# Patient Record
Sex: FEMALE | Race: WHITE | Employment: FULL TIME | URBAN - METROPOLITAN AREA
[De-identification: names, ages, dates, MRNs, and addresses within clinical notes are randomized per-mention and may not be internally consistent; named-entity substitution may affect disease eponyms.]

---

## 2020-04-16 ENCOUNTER — HOSPITAL ENCOUNTER (EMERGENCY)
Age: 25
Discharge: HOME | End: 2020-04-16
Attending: EMERGENCY MEDICINE
Payer: COMMERCIAL

## 2020-04-16 VITALS
WEIGHT: 195 LBS | OXYGEN SATURATION: 100 % | HEART RATE: 76 BPM | HEIGHT: 69 IN | BODY MASS INDEX: 28.88 KG/M2 | SYSTOLIC BLOOD PRESSURE: 135 MMHG | TEMPERATURE: 98.6 F | RESPIRATION RATE: 16 BRPM | DIASTOLIC BLOOD PRESSURE: 65 MMHG

## 2020-04-16 PROCEDURE — 99283 EMERGENCY DEPT VISIT LOW MDM: CPT

## 2020-04-16 NOTE — ED PROVIDER NOTES
 Food insecurity     Worry: None     Inability: None    Transportation needs     Medical: None     Non-medical: None   Tobacco Use    Smoking status: Never Smoker    Smokeless tobacco: Never Used   Substance and Sexual Activity    Alcohol use: Not Currently    Drug use: Never    Sexual activity: None   Lifestyle    Physical activity     Days per week: None     Minutes per session: None    Stress: None   Relationships    Social connections     Talks on phone: None     Gets together: None     Attends Sabianist service: None     Active member of club or organization: None     Attends meetings of clubs or organizations: None     Relationship status: None    Intimate partner violence     Fear of current or ex partner: None     Emotionally abused: None     Physically abused: None     Forced sexual activity: None   Other Topics Concern    None   Social History Narrative    None       SCREENINGS    [place] Coma Scale  Eye Opening: Spontaneous  Best Verbal Response: Oriented  Best Motor Response: Obeys commands  [place] Coma Scale Score: 15 @FLOW([id number])@      PHYSICAL EXAM    (up to 7 for level 4, 8 or more for level 5)     ED Triage Vitals   BP Temp Temp Source Pulse Resp SpO2 Height Weight   04/16/20 1837 04/16/20 1838 04/16/20 1838 04/16/20 1837 04/16/20 1837 04/16/20 1837 04/16/20 1835 04/16/20 1835   135/65 98.6 °F (37 °C) Oral 76 16 100 % 5' 8.5\" (1.74 m) 195 lb (88.5 kg)       Physical Exam  Vitals signs and nursing note reviewed. Constitutional:       General: She is not in acute distress. Appearance: She is well-developed. HENT:      Head: Normocephalic and atraumatic. Right Ear: External ear normal.      Left Ear: External ear normal.      Nose: Nose normal.      Mouth/Throat:      Mouth: Mucous membranes are moist.   Eyes:      General: No scleral icterus. Neck:      Musculoskeletal: Normal range of motion. Trachea: No tracheal deviation.    Cardiovascular:      Rate and Rhythm: Normal rate and regular rhythm. Pulmonary:      Effort: Pulmonary effort is normal. No respiratory distress. Abdominal:      General: There is no distension. Palpations: Abdomen is soft. Genitourinary:     Rectum: Guaiac result positive. Comments: Rectal exam notable for small amount of red blood, no hemorrhoids or anal fissures noted  Musculoskeletal:         General: No deformity or signs of injury. Skin:     General: Skin is warm and dry. Neurological:      Mental Status: She is alert and oriented to person, place, and time. Coordination: Coordination normal.         DIAGNOSTIC RESULTS   LABS:    Labs Reviewed - No data to display    All other labs were within normal range or not returned as of thisdictation. EKG: All EKG's are interpreted by the Emergency Department Physician who either signs or Co-signs this chart in the absence of a cardiologist.        RADIOLOGY:   Non-plain film images such as CT, Ultrasound and MRI are read by the radiologist. [place] images are visualized and preliminarily interpreted by the  ED Provider with the belowfindings:        Interpretation per the Radiologist below, if available at the time of this note:    No orders to display         PROCEDURES   Unless otherwise noted below, none     Procedures    CRITICAL CARE TIME   N/A    CONSULTS:  None    EMERGENCY DEPARTMENT COURSE and DIFFERENTIAL DIAGNOSIS/MDM:   Vitals:    Vitals:    04/16/20 1835 04/16/20 1837 04/16/20 1838   BP:  135/65    Pulse:  76    Resp:  16    Temp:   98.6 °F (37 °C)   TempSrc:   Oral   SpO2:  100% 100%   Weight: 195 lb (88.5 kg)     Height: 5' 8.5\" (1.74 m)         Patient was given the following medications:  Medications - No data to display    Patient presents ED with bright red blood per rectum for past 2 days. Patient states it was intermittent. Patient has not had this in the past.  Patient denies any abdominal pain straining or difficulty with defecation.   No diarrhea no constipation. On exam patient is nontoxic no acute distress abdomen soft nontender rectal exam notable for bleeding with no fissures or external hemorrhoids noted. Patient has normal vital signs and takes no medications. Patient otherwise looks well, recommend adding Metamucil to diet, close follow-up with primary care provider and GI next 2 days. The patient tolerated their visit well. Thepatient and / or the family were informed of the results of any tests, a time was given to answer questions. FINAL IMPRESSION      1. Rectal bleeding        DISPOSITION/PLAN   DISPOSITION Decision To Discharge 04/16/2020 07:25:10 PM      PATIENT REFERRED TO:  private Primary care    Call in 1 day      Spencer Owens MD  [id number] [facility]  [phone]    Call in 1 day        DISCHARGE MEDICATIONS:  There are no discharge medications for this patient. DISCONTINUED MEDICATIONS:  There are no discharge medications for this patient.              (Please note that portions of this note were completed with a voice recognition program.  Efforts were made to edit the dictations but occasionally words aremis-transcribed.)    Constantino Venegas DO (electronically signed)            Constantino Venegas DO  04/16/20 2002